# Patient Record
Sex: FEMALE | Race: WHITE | NOT HISPANIC OR LATINO | Employment: STUDENT | URBAN - METROPOLITAN AREA
[De-identification: names, ages, dates, MRNs, and addresses within clinical notes are randomized per-mention and may not be internally consistent; named-entity substitution may affect disease eponyms.]

---
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
## 2023-02-26 ENCOUNTER — NURSE TRIAGE (OUTPATIENT)
Dept: NURSING | Facility: CLINIC | Age: 18
End: 2023-02-26

## 2023-02-26 NOTE — TELEPHONE ENCOUNTER
Calling [place] line. Requesting refill on psych med. States psych MD not available on weekends. Asks if we can refill Rx. Advised we cannot refill her Rx; [facility] does not have an on-call provider and we do not have access to [place] records. Advised call clinic tomorrow when they reopen.     Reason for Disposition    [1] Prescription refill request for NON-ESSENTIAL medicine (i.e., no harm to patient if med not taken) AND [2] triager unable to refill per department policy    Additional Information    Negative: New-onset or worsening symptoms, see that guideline (e.g., diarrhea, runny nose, sore throat)    Negative: Medicine question not related to refill or renewal    Negative: Caller (e.g., patient or pharmacist) requesting information about a new medicine    Negative: Caller requesting information unrelated to medicine    Negative: [1] Prescription refill request for ESSENTIAL medicine (i.e., likelihood of harm to patient if not taken) AND [2] triager unable to refill per department policy    Negative: [1] Prescription not at pharmacy AND [2] was prescribed by PCP recently  (Exception: triager has access to EMR and prescription is recorded there. Go to Home Care and confirm for pharmacy.)    Negative: [1] Pharmacy calling with prescription questions AND [2] triager unable to answer question    Negative: Prescription request for new medicine (not a refill)    Negative: Caller requesting a CONTROLLED substance prescription refill (e.g., narcotics, ADHD medicines)    Protocols used: [facility]FI[facility]

## 2024-10-10 ENCOUNTER — OFFICE VISIT (OUTPATIENT)
Dept: OPHTHALMOLOGY | Facility: CLINIC | Age: 19
End: 2024-10-10

## 2024-10-10 DIAGNOSIS — H52.13 MYOPIA OF BOTH EYES: Primary | ICD-10-CM

## 2024-10-10 PROCEDURE — 92002 INTRM OPH EXAM NEW PATIENT: CPT | Performed by: OPTOMETRIST

## 2024-10-10 PROCEDURE — 92015 DETERMINE REFRACTIVE STATE: CPT | Performed by: OPTOMETRIST

## 2024-10-10 RX ORDER — QUETIAPINE FUMARATE 100 MG/1
150 TABLET, FILM COATED ORAL DAILY
COMMUNITY
Start: 2024-10-02

## 2024-10-10 RX ORDER — HYDROXYZINE PAMOATE 50 MG/1
50 CAPSULE ORAL DAILY PRN
COMMUNITY
Start: 2024-10-02

## 2024-10-10 RX ORDER — LAMOTRIGINE 200 MG/1
200 TABLET ORAL DAILY
COMMUNITY
Start: 2024-08-05

## 2024-10-10 ASSESSMENT — REFRACTION_CURRENTRX
OS_BRAND: ALCON DAILIES AQUA COMFORT PLUS BC 8.7, D 14.0
OS_SPHERE: -4.50
OS_CYLINDER: SPHERE
OD_CYLINDER: SPHERE
OD_BRAND: ALCON DAILIES AQUA COMFORT PLUS BC 8.7, D 14.0
OD_SPHERE: -4.50

## 2024-10-10 ASSESSMENT — SLIT LAMP EXAM - LIDS
COMMENTS: NORMAL
COMMENTS: NORMAL

## 2024-10-10 ASSESSMENT — VISUAL ACUITY
CORRECTION_TYPE: CONTACTS
OD_CC: 20/15
OD_CC+: -1
OS_CC: 20/15
OS_CC+: -1
METHOD: SNELLEN - LINEAR

## 2024-10-10 ASSESSMENT — REFRACTION_MANIFEST
OS_CYLINDER: +0.25
OD_SPHERE: -5.50
OD_AXIS: 064
OS_SPHERE: -5.00
OD_CYLINDER: +0.25
OS_AXIS: 032

## 2024-10-10 ASSESSMENT — TONOMETRY
OD_IOP_MMHG: 18
OS_IOP_MMHG: 18
IOP_METHOD: ICARE

## 2024-10-10 ASSESSMENT — CONF VISUAL FIELD
OS_INFERIOR_TEMPORAL_RESTRICTION: 0
OS_INFERIOR_NASAL_RESTRICTION: 0
OS_NORMAL: 1
METHOD: COUNTING FINGERS
OS_SUPERIOR_TEMPORAL_RESTRICTION: 0
OD_SUPERIOR_NASAL_RESTRICTION: 0
OD_SUPERIOR_TEMPORAL_RESTRICTION: 0
OD_NORMAL: 1
OD_INFERIOR_TEMPORAL_RESTRICTION: 0
OD_INFERIOR_NASAL_RESTRICTION: 0
OS_SUPERIOR_NASAL_RESTRICTION: 0

## 2024-10-10 ASSESSMENT — CUP TO DISC RATIO
OS_RATIO: 0.25
OD_RATIO: 0.25

## 2024-10-10 NOTE — PROGRESS NOTES
A/P  1.) Myopia OU  -Doing well in current CL's, seeing 20/15. Continue with current Rx  -Undilated ocular health unremarkable. Possible reaction to Fluorescein dye in the past but not to tropicamide from history. She deferred dilation today - rec when able    Monitor 1-2 years comprehensive, sooner prn    I have confirmed the patient's CC, HPI and reviewed Past Medical History, Past Surgical History, Social History, Family History, Problem List, Medication List and agree with Tech note.     Jud Webster, OD FAAO FSLS

## 2025-01-23 ENCOUNTER — OFFICE VISIT (OUTPATIENT)
Dept: UROLOGY | Facility: CLINIC | Age: 20
End: 2025-01-23

## 2025-01-23 VITALS
DIASTOLIC BLOOD PRESSURE: 76 MMHG | HEART RATE: 99 BPM | WEIGHT: 127 LBS | BODY MASS INDEX: 22.5 KG/M2 | HEIGHT: 63 IN | SYSTOLIC BLOOD PRESSURE: 106 MMHG

## 2025-01-23 DIAGNOSIS — R10.2 SUPRAPUBIC ABDOMINAL PAIN: ICD-10-CM

## 2025-01-23 DIAGNOSIS — N39.0 FREQUENT UTI: Primary | ICD-10-CM

## 2025-01-23 PROCEDURE — 99214 OFFICE O/P EST MOD 30 MIN: CPT

## 2025-01-23 ASSESSMENT — PAIN SCALES - GENERAL: PAINLEVEL_OUTOF10: NO PAIN (0)

## 2025-01-23 NOTE — PROGRESS NOTES
"  [facility] [place]  [address]  [place] [facility] [address]  Phone: [phone]  Fax: [phone]     UTI / Recurrent UTI    Subjective    Marta Saldivar is a 19 year old year old female presenting for evaluation of frequent UTI. She usually reports to [facility] for her UTI s/s and can access lab info from her mychart.     She denies urinary pain, fever, chills, flank pain or blood in urine today. She has a new sex partner (AMAB) and days directly after sex she feels 'stingy' suprapubic pain and dysuria. She takes Azo and it 'numbs' that feeling. She also had this issue in high [facility], also related to sex. She is using 'skin' brand condoms each/every sexual episode. She has used other brands in past with no urethral irritation. Generally there is no pain with sex (no entry pain; no deeper pain with penetration).    # culture proven UTIs past 6 months: 0  #culture proven UTIs past year: 0    Hx of UTI  - July 16 2024 [place] culture 'mixed elyssa' - tx with macrobid    UTI Risk factors:   history of maternal Ayo: no  personal history of UTI before age 13: no  pelvic floor dysfunction: no   hypoestrogenic state: no  urinary incontinence: no  abnormal genitourinary anatomy: no  immunosuppression, no  chronic catheterization, no   history of surgical mesh repair, no    Objective  ./76   Pulse 99   Ht 1.6 m (5' 2.99\")   Wt 57.6 kg (127 lb)   LMP 01/19/2025 (Approximate)   BMI 22.50 kg/m    General: pleasant female in no acute distress  Psych: normal mentation, well oriented  Respiratory:  Unlabored breathing  Musculoskeletal: no gross deformities    PE  General: pleasant female in no acute distress  Psych: normal mentation, well oriented  Respiratory:  Unlabored breathing  Musculoskeletal: no gross deformities  Pelvic Exam:  Q tip test: negative for vulvodynia, negative for vestibulodynia  Clitoris: carney mobile with no " adhesions or phimosis  Vulva: No external lesions, hair shaved, normal architecture, no hypopigmentation or skin changes  Vestibule: mucosa pink moist intact with no lesions, swelling or injury  Urethra: wnl pink healthy with no prolapse/caruncle masses lesions or sensitivity to palpation  Vagina: Moist, pink, no abnormal discharge, no lesions at intriotus. Recent STI screen at [place] and pt declines STI swab. No speculum placed. NO samples.   Cervix: not visualized. No speculum placed today  Uterus: Normal size, midline non-tender, mobile  Adnexa: Non-tender, no masses  Anus: skin intact with no lesions, fissures or external hemorrhoids    Assessment  Encounter Diagnoses   Name Primary?    Frequent UTI Yes    Suprapubic abdominal pain      Marta was seen today for consult.    Diagnoses and all orders for this visit:    Frequent UTI  -     Routine UA with micro reflex to culture; Standing  -     Urine Culture Aerobic Bacterial; Standing    Suprapubic abdominal pain      Plan  We discussed no overt UTI s/s today, no urine sample today  We discussed call [place] or  for annual exam and first PAP due    - STANDING order UA and Culture - call any [facility] lab at first s/s of UTI and leave urine sample  - do not take any antibiotic without culture confirmed UTI  - CHANGE condom brand to test if the lubricant on condom is causing post coital irritation  - TRIAL non allergenic lube like uberlube (samples given)  - Consider 25 PAC Cranberry or D-mannose supplment for prevention   - Follow up in 1 month for virtual visit re-evaluation      I spent a total of 30 minutes with  Marta Saldivar assessing patient's relevant history, evaluating patient, discussing treatment options and coordinating care.     Maryam Spivey Pelvic Medicine and Reconstructive Surgery ( Urogynecology )

## 2025-01-23 NOTE — LETTER
"1/23/2025       RE: Marta Saldivar  [address]  [facility] [address]     Dear Colleague,    Thank you for referring your patient, Marta Saldivar, to the [facility]. Please see a copy of my visit note below.      [facility]  [address]  [place] [facility] [address]  Phone: [phone]  Fax: [phone]     UTI / Recurrent UTI    Subjective    Marta Saldivar is a 19 year old year old female presenting for evaluation of frequent UTI. She usually reports to [facility] for her UTI s/s and can access lab info from her mychart.     She denies urinary pain, fever, chills, flank pain or blood in urine today. She has a new sex partner (AMAB) and days directly after sex she feels 'stingy' suprapubic pain and dysuria. She takes Azo and it 'numbs' that feeling. She also had this issue in [facility], also related to sex. She is using 'skin' brand condoms each/every sexual episode. She has used other brands in past with no urethral irritation. Generally there is no pain with sex (no entry pain; no deeper pain with penetration).    # culture proven UTIs past 6 months: 0  #culture proven UTIs past year: 0    Hx of UTI  - July 16 2024 [place] culture 'mixed elyssa' - tx with macrobid    UTI Risk factors:   history of maternal Ayo: no  personal history of UTI before age 13: no  pelvic floor dysfunction: no   hypoestrogenic state: no  urinary incontinence: no  abnormal genitourinary anatomy: no  immunosuppression, no  chronic catheterization, no   history of surgical mesh repair, no    Objective  ./76   Pulse 99   Ht 1.6 m (5' 2.99\")   Wt 57.6 kg (127 lb)   LMP 01/19/2025 (Approximate)   BMI 22.50 kg/m    General: pleasant female in no acute distress  Psych: normal mentation, well oriented  Respiratory:  Unlabored " breathing  Musculoskeletal: no gross deformities    PE  General: pleasant female in no acute distress  Psych: normal mentation, well oriented  Respiratory:  Unlabored breathing  Musculoskeletal: no gross deformities  Pelvic Exam:  Q tip test: negative for vulvodynia, negative for vestibulodynia  Clitoris: carney mobile with no adhesions or phimosis  Vulva: No external lesions, hair shaved, normal architecture, no hypopigmentation or skin changes  Vestibule: mucosa pink moist intact with no lesions, swelling or injury  Urethra: wnl pink healthy with no prolapse/caruncle masses lesions or sensitivity to palpation  Vagina: Moist, pink, no abnormal discharge, no lesions at intriotus. Recent STI screen at [place] and  declines STI swab. No speculum placed. NO samples.   Cervix: not visualized. No speculum placed today  Uterus: Normal size, midline non-tender, mobile  Adnexa: Non-tender, no masses  Anus: skin intact with no lesions, fissures or external hemorrhoids    Assessment  Encounter Diagnoses   Name Primary?     Frequent UTI Yes     Suprapubic abdominal pain      Marta was seen today for consult.    Diagnoses and all orders for this visit:    Frequent UTI  -     Routine UA with micro reflex to culture; Standing  -     Urine Culture Aerobic Bacterial; Standing    Suprapubic abdominal pain      Plan  We discussed no overt UTI s/s today, no urine sample today  We discussed call [place] or  for annual exam and first PAP due    - STANDING order UA and Culture - call any [facility] lab at first s/s of UTI and leave urine sample  - do not take any antibiotic without culture confirmed UTI  - CHANGE condom brand to test if the lubricant on condom is causing post coital irritation  - TRIAL non allergenic lube like uberlube (samples given)  - Consider 25 PAC Cranberry or D-mannose supplment for prevention   - Follow up in 1 month for virtual visit re-evaluation      I spent a total of 30 minutes with  Marta Saldivar assessing  patient's relevant history, evaluating patient, discussing treatment options and coordinating care.     Maryam SHEPARDFormerly [facility] Pelvic Medicine and Reconstructive Surgery ( Urogynecology )       Again, thank you for allowing me to participate in the care of your patient.      Sincerely,    MARIA DEL CARMEN Echevarria CNP

## 2025-02-10 NOTE — PROGRESS NOTES
February 11, 2025    Return visit    CC: bladder pain after sex    A/P: Marta Saldivar is a 19 year old year old female returning to clinic for for re-evaluation of the sensation of suprapubic 'stinging' after sex.     Encounter Diagnosis   Name Primary?    Suprapubic pain Yes     Marta was seen today for uti.    Diagnoses and all orders for this visit:    Suprapubic pain  -     Physical Therapy  Referral; Future      1) Today we further discussed continuing post coital suprapubic pain    We reviewed history and pelvic exam findings from first encounter:   - no structural, congenital or dermatologic sources for pelvic pain, and negative for vulvodynia and vestibulodynia  - Marta does not presently meet diagnostic criteria for hormonally mediated pelvic pain and is not on any antiandrogenic medications which may have down regulated vestibular/bladder androgen receptors  - Marta does not meet diagnostic criteria for vaginismus or GPPPD (penetrative pain disorder):  no entry pain, no involuntary muscle spasm, no fear of sex or sex related pain    Other possible sources of suprapubic pain certainly exist:   -  Bladder pain syndrome / IC: low suspicion for this as there is no bladder filling symptom   -  other syndromes of increased sensitivity such as fibromyalgia - low suspicion for this as Marta has no diffuse pattern of pain  -  pelvic congestion - vascular congestion - low suspicion as usually associated with lower extremity/vulvar varicosities    2) START pelvic floor PT to further evaluate for possible pelvic floor dysfunction / myofascial trigger points   At time of our initial pelvic exam, there was no overt myofascial tenderness, pain or elevated pelvic floor tone; however, it is very possible that at rest there is no pelvic floor dysfunction but that sex/orgasm itself could be a trigger, so we can consider thorough evaluation by PFPT to further inform us and workup root cause of pain    3)  Symptomatic treatment of post coital suprapubic pain  - pre dose before sex with both Azo and ibuprofen/alleve to 'get ahead' of the expected pain  - continue to use sexual lubricant to decrease friction and mechanical stress to urethra  - anectodally, some patients with bladder discomfort use an aloe vera supplement called desert harvest; consider short trial to see if it has any impact    4) Continue our plan of submitting urine for UA micro & Culture with first sign of bladdder infection: frequency, urgency, painful urination, cloudy urine, blood in urine etc. We want continued evidence that post coital UTIs are not a part of the post coital pain.     5) Return to clinic at will for annual exam and first PAP due    Subjective    Marta Saldivar is a 19 year old year old female returning to clinic for for re-evaluation of the sensation of suprapubic 'stinging' after sex. She continues to experience post coital bladder pain. She has no UTI symptoms today (no urgency, no frequency, no painful urination).     Marta relates no real change to post-coital suprapubic pain:   Directly after sex feels 'stinging' suprapubic pain 3-7/10 that lasts 24 hrs  Pain is deep just above pubic bone, not skin deep  Pain does not worsen or improve with orgasm or sexual position   Pain abates briefly when she pees directly after sex, but returns hrs later  Pain continues to imrprove with Azo, which numbs the pain    Since our last visit Marta went to a [facility] clinic on 2/1/25 with uriary frequency and mild vulvar itching. The urine culture showed no growth and she did not take any antibiotics. Her vaginal swab confirmed yeast and used vagisil which burned too much at the vulva, so she switche to monastat x 3 days which helped. She has very mild residual itching today and plans to return to using monistat cream until itching resolves.     Marta says that regardless of condom brand used, there was still discomfort after sex. Pradip  "helped quite a bit in terms of decreasing friction. She has never tried tried taking either azo or ibuprofen prior to sex. She has not tried either D mannose or 25 PAC cranberry supplement as prevention.       This note was copied and pasted from Dr Ram on 1/23/25    Subjective  Marta Saldivar is a 19 year old year old female presenting for evaluation of frequent UTI. She usually reports to [facility] for her UTI s/s and can access lab info from her mychart.      She denies urinary pain, fever, chills, flank pain or blood in urine today. She has a new sex partner (AMAB) and days directly after sex she feels 'stingy' suprapubic pain and dysuria. She takes Azo and it 'numbs' that feeling. She also had this issue in high [facility], also related to sex. She is using 'skin' brand condoms each/every sexual episode. She has used other brands in past with no urethral irritation. Generally there is no pain with sex (no entry pain; no deeper pain with penetration).     # culture proven UTIs past 6 months: 0  #culture proven UTIs past year: 0     Hx of UTI like symptoms after sex  - July 16 2024 [place] culture 'mixed elyssa' - tx with macrobid    UTI Risk factors: none  Objective  ./76   Pulse 99   Ht 1.6 m (5' 2.99\")   Wt 57.6 kg (127 lb)   LMP 01/19/2025 (Approximate)   BMI 22.50 kg/m    General: pleasant female in no acute distress  Psych: normal mentation, well oriented  Respiratory:  Unlabored breathing  Musculoskeletal: no gross deformities     PE  General: pleasant female in no acute distress  Psych: normal mentation, well oriented  Respiratory:  Unlabored breathing  Musculoskeletal: no gross deformities  Pelvic Exam: deferred by patient recovering from yeast infection     Assessment       Encounter Diagnoses   Name Primary?    Frequent UTI Yes    Suprapubic abdominal pain       Frequent UTI  -     Routine UA with micro reflex to culture; Standing  -     Urine Culture Aerobic Bacterial; Standing   "   Suprapubic abdominal pain     Plan  We discussed no overt UTI s/s today, no urine sample today  We discussed call [facility] or [facility] for annual exam and first PAP due     - STANDING order UA and Culture - call any [facility] lab at first s/s of UTI and leave urine sample  - do not take any antibiotic without culture confirmed UTI  - CHANGE condom brand to test if the lubricant on condom is causing post coital irritation  - TRIAL non allergenic lube like uberlube (samples given)  - Consider 25 PAC Cranberry or D-mannose supplment for prevention   - Follow up in 1 month for virtual visit re-evaluation     Objective  LMP 01/19/2025 (Approximate)   General: pleasant female in no acute distress  Psych: normal mentation, well oriented  Respiratory:  Unlabored breathing  Musculoskeletal: no gross deformities  Pelvic exam    I spent a total of 30  minutes with Marta Saldivar on the date of the encounter in chart review, face to face patient visit, review of tests, documentation and/or discussion with other providers about the issues documented above.    Maryam SHEPARD  Female Pelvic Medicine and Reconstructive Surgery ( Urogynecology )    CC  Patient Care Team:  No Ref-Primary, Physician as PCP - General  Jud Webster OD (Optometry)  Jud Webtser OD as Assigned Surgical Provider  Ann Ram APRN CNP as Nurse Practitioner  SELF, REFERRED

## 2025-02-11 ENCOUNTER — OFFICE VISIT (OUTPATIENT)
Dept: UROLOGY | Facility: CLINIC | Age: 20
End: 2025-02-11

## 2025-02-11 VITALS — HEIGHT: 63 IN | DIASTOLIC BLOOD PRESSURE: 85 MMHG | SYSTOLIC BLOOD PRESSURE: 110 MMHG | BODY MASS INDEX: 22.5 KG/M2

## 2025-02-11 DIAGNOSIS — R10.2 SUPRAPUBIC PAIN: Primary | ICD-10-CM

## 2025-02-11 LAB
ALBUMIN UR-MCNC: NEGATIVE MG/DL
APPEARANCE UR: CLEAR
BACTERIA #/AREA URNS HPF: ABNORMAL /HPF
BILIRUB UR QL STRIP: NEGATIVE
COLOR UR AUTO: YELLOW
GLUCOSE UR STRIP-MCNC: NEGATIVE MG/DL
HGB UR QL STRIP: NEGATIVE
KETONES UR STRIP-MCNC: NEGATIVE MG/DL
LEUKOCYTE ESTERASE UR QL STRIP: NEGATIVE
MUCOUS THREADS #/AREA URNS LPF: PRESENT /LPF
NITRATE UR QL: NEGATIVE
PH UR STRIP: 5.5 [PH] (ref 5–7)
RBC #/AREA URNS AUTO: ABNORMAL /HPF
SP GR UR STRIP: 1.02 (ref 1–1.03)
SQUAMOUS #/AREA URNS AUTO: ABNORMAL /LPF
UROBILINOGEN UR STRIP-ACNC: 0.2 E.U./DL
WBC #/AREA URNS AUTO: ABNORMAL /HPF

## 2025-02-11 PROCEDURE — 81001 URINALYSIS AUTO W/SCOPE: CPT

## 2025-02-11 PROCEDURE — 99214 OFFICE O/P EST MOD 30 MIN: CPT

## 2025-02-11 PROCEDURE — 87088 URINE BACTERIA CULTURE: CPT

## 2025-02-11 RX ORDER — QUETIAPINE FUMARATE 200 MG/1
TABLET, FILM COATED ORAL
COMMUNITY
Start: 2025-01-27

## 2025-02-11 NOTE — PATIENT INSTRUCTIONS
Thank you for trusting us with your care!   Please be aware, if you are on Mychart, you may see your results prior to your providers review. If labs are abnormal, we will call or message you on [employer] with a follow up plan.    If you need to contact us for questions about:  Symptoms, Scheduling & Medical Questions; Non-urgent (2-3 day response) Authorly message, Urgent (needing response today) [phone] (if after 3:30pm next day response)   Prescriptions: Please call your Pharmacy   Billing: [place] [phone] or [facility]:[phone]

## 2025-02-12 LAB — BACTERIA UR CULT: ABNORMAL

## 2025-03-18 ENCOUNTER — THERAPY VISIT (OUTPATIENT)
Age: 20
End: 2025-03-18

## 2025-03-18 DIAGNOSIS — R10.2 SUPRAPUBIC PAIN: Primary | ICD-10-CM

## 2025-03-18 NOTE — PROGRESS NOTES
PHYSICAL THERAPY EVALUATION  Type of Visit: Evaluation        Fall Risk Screen:  Fall screen completed by: PT  Have you fallen 2 or more times in the past year?: No  Have you fallen and had an injury in the past year?: No  Is patient a fall risk?: No    Subjective     Pt is a 20yo F presenting to PT with frequent urination, lower abdominal pain; pain w urination. Started getting UTIs frequently since she became sexually active jojo year of high school. Had a full year of UTI sx daily. Took antibiotics but didn't help sx. Feels pain in pelvic/abdominal region and burning with urination and frequency after having intercourse. Changed lubricant brands - uberlube, which has helped, tried changing condom brands with no change. Azo helps with symptoms, cranberry juice hasn't been helpful. Symptoms 1x every 2 weeks, not every time she has intercourse. Heat helps a little with sx.        Presenting condition or subjective complaint: frequent urination and UTI-like symptoms after sexual intercourse, discomfort in lower abdomen and cramps, pain/burning during urination  Date of onset: 02/11/25 (order date)    Relevant medical history: Bladder or bowel problems; Depression; Mental Illness; Sleep disorder like apnea   Past Medical History:   Diagnosis Date    Anxiety     Depression     Myopia, bilateral    Dates & types of surgery: wisdom teeth - September 2021   Prior diagnostic imaging/testing results:       Prior therapy history for the same diagnosis, illness or injury: No    Prior Level of Function  Pt is IND with transfers, amb, ADLs and IADLs at baseline.  Living Environment  Social support: With a significant other or spouse   Type of home: Apartment/condo   Stairs to enter the home: No       Ramp: No   Stairs inside the home: No       Help at home: Home management tasks (cooking, cleaning)  Equipment owned:     Employment: Yes   Hobbies/Interests: Pickleball, weight-lifting, music,  movies    Patient goals for therapy: Feel less pain after sexual intercourse with fewer UTI-like symptons right afterward or the next morning, not have to worry about it    Pain assessment: See objective evaluation for additional pain details     Objective      PELVIC EVALUATION  ADDITIONAL HISTORY:  Sex assigned at birth: Female  Gender identity: Female    Pronouns: She/Her Hers      Bladder History:  Feels bladder filling: Yes  Triggers for feeling of inability to wait to go to the bathroom: No    How long can you wait to urinate: 2 hours  Gets up at night to urinate: Yes 1  Can stop the flow of urine when urinating: Yes  Volume of urine usually released: Medium   Other issues: Bladder infections  Number of bladder infections in last 12 months: around 6  Fluid intake per day: 16 oz 12 oz    Medications taken for bladder: Yes Azo   Activities causing urine leak:      Amount of urine typically leaked:    Pads used to help with leaking:          Bowel History:  Frequency of bowel movement: every other day  Consistency of stool: Soft    Ignores the urge to defecate: No  Other bowel issues:    Length of time spent trying to have a bowel movement:      Sexual Function History:  Sexual orientation: Straight    Sexually active: Yes  Lubrication used: Yes Yes  Pelvic pain: Certain positions; Initial penetration (rectal or vaginal); Deep penetration (rectal or vaginal); Use of tampon from behind, deep penetration, legs higher up  Pain or difficulty with orgasms/erection/ejaculation: No    State of menopause:    Hormone medications: No      Are you currently pregnant: No  Have you been diagnosed with pelvic prolapse or abdominal separation: No  Do you get regular exercise: Yes  I do this type of exercise: weight-lifting, pickleball  Have you tried pelvic floor strengthening exercises for 4 weeks: No  Do you have any history of trauma that is relevant to your care that you d like to share: No      Discussed reason for  referral regarding pelvic health needs and external/internal pelvic floor muscle examination with patient/guardian.  Opportunity provided to ask questions and verbal consent for assessment and intervention was given.    PAIN: Pain Location: above pubic bone, abdomen  Pain is Exacerbated By: intercourse  POSTURE: WFL  BREATHING SYMMETRY: WNL    PELVIC EXAM  External Visual Inspection:  At rest: Normal  With voluntary pelvic floor contraction: Perineal elevation  Relaxation of PFM: Yes  With intra-abdominal pressure: Bearing down as defecation: Perineal descent    Integumentary:   Introitus: Unremarkable, Irritation of urethral opening     External Digital Palpation per Perineum:   Ischiocavernosis: Unremarkable  Bulbo cavernosis: Unremarkable  Transverse perineal: Unremarkable  Levator ani: Unremarkable  Perineal body: Unremarkable    Internal Digital Palpation:  Per Vagina:  Tenderness  Myofascial Resistance to Palpation: Soft, Taut  Digital Muscle Performance: P (Power): 3/5  E (Endurance): 10s  R (Repetitions): not assessed  F (Fast Twitch): 7 repetitions  Relaxation Post-Contraction: Normal  Tender R>L  Urethra mobility: hypomobile and tender  Pubourethral ligament: tenderness    Assessment & Plan   CLINICAL IMPRESSIONS  Medical Diagnosis: Suprapubic pain (R10.2)    Treatment Diagnosis: Pelvic Pain   Impression/Assessment: Patient is a 19 year old female with pelvic pain complaints.  The following significant findings have been identified: Pain, Decreased ROM/flexibility, Impaired muscle performance, and Decreased activity tolerance. These impairments interfere with their ability to perform self care tasks and recreational activities as compared to previous level of function.     Clinical Decision Making (Complexity):  Clinical Presentation: Stable/Uncomplicated  Clinical Presentation Rationale: based on medical and personal factors listed in PT evaluation  Clinical Decision Making (Complexity): Low  complexity    PLAN OF CARE  Treatment Interventions:  Modalities: Biofeedback, Cryotherapy, Hot Pack  Interventions: Manual Therapy, Neuromuscular Re-education, Therapeutic Activity, Therapeutic Exercise, Self-Care/Home Management    Long Term Goals     PT Goal 1  Goal Identifier: HEP  Goal Description: Pt will be IND with HEP in order to manage condition on their own.  Goal Progress: new goal  Target Date: 06/09/25  PT Goal 2  Goal Description: Patient will report <1/10 pelvic pain for at least 2 weeks to show improved symptoms  Rationale: to maximize safety and independence with performance of ADLs and functional tasks  Target Date: 06/09/25      Frequency of Treatment: 2x/month as needed  Duration of Treatment: 12 weeks    Recommended Referrals to Other Professionals:  none at this time   Education Assessment:   Learner/Method: Patient;Listening;Reading;Demonstration;Pictures/Video    Risks and benefits of evaluation/treatment have been explained.   Patient/Family/caregiver agrees with Plan of Care.     Evaluation Time:     PT Eval, Low Complexity Minutes (40609): 30     Signing Clinician: April Perkins, PT, DPT

## 2025-03-25 ENCOUNTER — THERAPY VISIT (OUTPATIENT)
Age: 20
End: 2025-03-25

## 2025-03-25 DIAGNOSIS — R10.2 SUPRAPUBIC PAIN: Primary | ICD-10-CM

## 2025-03-25 PROCEDURE — 97140 MANUAL THERAPY 1/> REGIONS: CPT | Mod: GP

## 2025-03-25 PROCEDURE — 97530 THERAPEUTIC ACTIVITIES: CPT | Mod: GP
